# Patient Record
Sex: FEMALE | Race: WHITE | NOT HISPANIC OR LATINO | URBAN - METROPOLITAN AREA
[De-identification: names, ages, dates, MRNs, and addresses within clinical notes are randomized per-mention and may not be internally consistent; named-entity substitution may affect disease eponyms.]

---

## 2022-12-20 ENCOUNTER — OFFICE VISIT (OUTPATIENT)
Dept: URBAN - METROPOLITAN AREA CLINIC 9 | Facility: CLINIC | Age: 32
End: 2022-12-20
Payer: COMMERCIAL

## 2022-12-20 ENCOUNTER — WEB ENCOUNTER (OUTPATIENT)
Dept: URBAN - METROPOLITAN AREA CLINIC 9 | Facility: CLINIC | Age: 32
End: 2022-12-20

## 2022-12-20 VITALS
BODY MASS INDEX: 32.47 KG/M2 | DIASTOLIC BLOOD PRESSURE: 76 MMHG | WEIGHT: 202 LBS | HEIGHT: 66 IN | SYSTOLIC BLOOD PRESSURE: 120 MMHG

## 2022-12-20 DIAGNOSIS — K92.1 RECTAL BLEEDING: ICD-10-CM

## 2022-12-20 DIAGNOSIS — R13.10 DYSPHAGIA, UNSPECIFIED TYPE: ICD-10-CM

## 2022-12-20 DIAGNOSIS — K64.9 HEMORRHOID: ICD-10-CM

## 2022-12-20 PROCEDURE — 99204 OFFICE O/P NEW MOD 45 MIN: CPT | Performed by: INTERNAL MEDICINE

## 2022-12-20 RX ORDER — FLUTICASONE FUROATE 50 UG/1
1 PUFF POWDER RESPIRATORY (INHALATION) ONCE A DAY
Status: ACTIVE | COMMUNITY

## 2022-12-20 NOTE — HPI-TODAY'S VISIT:
32-year-old female comes in for dysphagia and hemorrhoids.  Labs in July showed a hemoglobin of 9.2 but with a normal MCV.  She did have a one-time elevation in alk phos to 140 in August.  I do not have iron studies we do have a CT scan from  which showed stool burden postsurgical changes of  section  She is here for 2 issues.  Patient says she has had problems with intermittent dysphagia in the past.  She actually was dilated several years ago and she was told she has scar tissue.  She was told this was from another EGD that apparently she woke up with and pulled the scope out.  She said she was dilated for years ago and was okay until about 2 weeks ago when she started having intermittent problems mainly with Gummies and pills.  Not really with food or liquids.  She does have asthma and seasonal allergies.  The other issue is since delivery she has had a lot of pelvic floor dysfunction and she is seeing physical therapy for this.  She has had intermittent rectal bleeding which is now resolved but also intermittent prolapsing internal hemorrhoids.  No family history of GI issues or malignancies

## 2023-01-11 ENCOUNTER — TELEPHONE ENCOUNTER (OUTPATIENT)
Dept: URBAN - METROPOLITAN AREA CLINIC 9 | Facility: CLINIC | Age: 33
End: 2023-01-11

## 2023-01-12 ENCOUNTER — OFFICE VISIT (OUTPATIENT)
Dept: URBAN - METROPOLITAN AREA SURGERY CENTER 9 | Facility: SURGERY CENTER | Age: 33
End: 2023-01-12

## 2023-01-18 ENCOUNTER — CLAIMS CREATED FROM THE CLAIM WINDOW (OUTPATIENT)
Dept: URBAN - METROPOLITAN AREA CLINIC 4 | Facility: CLINIC | Age: 33
End: 2023-01-18
Payer: COMMERCIAL

## 2023-01-18 ENCOUNTER — CLAIMS CREATED FROM THE CLAIM WINDOW (OUTPATIENT)
Dept: URBAN - METROPOLITAN AREA SURGERY CENTER 9 | Facility: SURGERY CENTER | Age: 33
End: 2023-01-18
Payer: COMMERCIAL

## 2023-01-18 DIAGNOSIS — K62.5 ANAL BLEEDING: ICD-10-CM

## 2023-01-18 DIAGNOSIS — K21.9 GASTRO-ESOPHAGEAL REFLUX DISEASE WITHOUT ESOPHAGITIS: ICD-10-CM

## 2023-01-18 DIAGNOSIS — K64.0 GRADE I HEMORRHOIDS: ICD-10-CM

## 2023-01-18 DIAGNOSIS — K22.2 ACQUIRED ESOPHAGEAL RING: ICD-10-CM

## 2023-01-18 DIAGNOSIS — R13.10 ABNORMAL DEGLUTITION: ICD-10-CM

## 2023-01-18 PROCEDURE — 43248 EGD GUIDE WIRE INSERTION: CPT | Performed by: INTERNAL MEDICINE

## 2023-01-18 PROCEDURE — 88305 TISSUE EXAM BY PATHOLOGIST: CPT | Performed by: PATHOLOGY

## 2023-01-18 PROCEDURE — 43239 EGD BIOPSY SINGLE/MULTIPLE: CPT | Performed by: INTERNAL MEDICINE

## 2023-01-18 PROCEDURE — 45378 DIAGNOSTIC COLONOSCOPY: CPT | Performed by: INTERNAL MEDICINE

## 2023-01-18 RX ORDER — FLUTICASONE FUROATE 50 UG/1
1 PUFF POWDER RESPIRATORY (INHALATION) ONCE A DAY
Status: ACTIVE | COMMUNITY

## 2023-02-07 PROBLEM — 40739000: Status: ACTIVE | Noted: 2022-12-22

## 2023-02-16 ENCOUNTER — DASHBOARD ENCOUNTERS (OUTPATIENT)
Age: 33
End: 2023-02-16

## 2023-02-16 ENCOUNTER — OFFICE VISIT (OUTPATIENT)
Dept: URBAN - METROPOLITAN AREA CLINIC 9 | Facility: CLINIC | Age: 33
End: 2023-02-16
Payer: COMMERCIAL

## 2023-02-16 VITALS
WEIGHT: 201 LBS | HEIGHT: 66 IN | BODY MASS INDEX: 32.3 KG/M2 | SYSTOLIC BLOOD PRESSURE: 100 MMHG | DIASTOLIC BLOOD PRESSURE: 64 MMHG

## 2023-02-16 DIAGNOSIS — K92.1 RECTAL BLEEDING: ICD-10-CM

## 2023-02-16 DIAGNOSIS — K64.9 HEMORRHOID: ICD-10-CM

## 2023-02-16 DIAGNOSIS — R13.10 DYSPHAGIA, UNSPECIFIED TYPE: ICD-10-CM

## 2023-02-16 PROCEDURE — 99214 OFFICE O/P EST MOD 30 MIN: CPT | Performed by: INTERNAL MEDICINE

## 2023-02-16 RX ORDER — FLUTICASONE FUROATE 50 UG/1
1 PUFF POWDER RESPIRATORY (INHALATION) ONCE A DAY
Status: ACTIVE | COMMUNITY

## 2023-02-16 NOTE — HPI-TODAY'S VISIT:
32-year-old female comes in for dysphagia and hemorrhoids.  Labs in July showed a hemoglobin of 9.2 but with a normal MCV.  She did have a one-time elevation in alk phos to 140 in August.  I do not have iron studies we do have a CT scan from  which showed stool burden postsurgical changes of  section  She is here for 2 issues.  Patient says she has had problems with intermittent dysphagia in the past.  She actually was dilated several years ago and she was told she has scar tissue.  She was told this was from another EGD that apparently she woke up with and pulled the scope out.  She said she was dilated for years ago and was okay until about 2 weeks ago when she started having intermittent problems mainly with Gummies and pills.  Not really with food or liquids.  She does have asthma and seasonal allergies.  The other issue is since delivery she has had a lot of pelvic floor dysfunction and she is seeing physical therapy for this.  She has had intermittent rectal bleeding which is now resolved but also intermittent prolapsing internal hemorrhoids.  No family history of GI issues or malignancies  At last visit we proceeded with EGD with esophageal biopsies colonoscopy Benefiber twice a day and consider hemorrhoidal banding. Colonoscopy was normal other than small internal hemorrhoids.  EGD was normal other than small hiatal hernia and benign esophageal stenosis.  Esophageal biopsies were normal.  Patient was dilated empirically with 48  She comes in for follow-up today.  Since dilation her dysphagia has completely resolved.  Her biopsies did not show EOE.  Also the rectal bleeding has resolved on once a day Benefiber. She still gets very occasional intermittent prolapsing internal hemorrhoids